# Patient Record
(demographics unavailable — no encounter records)

---

## 2025-06-09 NOTE — HISTORY OF PRESENT ILLNESS
[FreeTextEntry1] : Mr. Alcantar is a 67M with history of cardiomyopathy, VT with ICD, CAD with PCI presented for evaluation for ANOCA.   Reports episodes of chest pain, postly at rest and sporadic but pressure in chest. Recently had diagnostic catheterization told that had evidence of coronary spasm. Reports some relief with nitro.   PMH: Hx of VT requiring ICD, shocked 1x, CM, CAD, PCI  Meds: aspirin 81, famotidine 40, Toprol 25, Nifedipine 30, PRN nitroglycern SL, ranexa 500 BID, rosuvastatin 10, tafluprost, dorzolamide-timolol Social: Never smoker, occassional ETOH, denies recreational- no cocaine, no methamphtamines Fhx; +family history

## 2025-06-09 NOTE — CARDIOLOGY SUMMARY
[de-identified] : Sinus bradycardia with RBBB, 1 apc [de-identified] : [facility] Dec 2024: (Disk provided-- RCA appears dominant with mild diffuse dsiease, LM mild luminal irregularities LAD mild diffuse disease, D1 patent stent, Cx mild diffuse disease

## 2025-06-09 NOTE — PHYSICAL EXAM
[Well Developed] : well developed [Well Nourished] : well nourished [Normal Conjunctiva] : normal conjunctiva [Normal Venous Pressure] : normal venous pressure [Normal S1, S2] : normal S1, S2 [No Murmur] : no murmur [Clear Lung Fields] : clear lung fields [Good Air Entry] : good air entry [No Respiratory Distress] : no respiratory distress  [Normal Gait] : normal gait [Soft] : abdomen soft [Non Tender] : non-tender [No Edema] : no edema [No Cyanosis] : no cyanosis [No Rash] : no rash [Moves all extremities] : moves all extremities [Alert and Oriented] : alert and oriented

## 2025-06-09 NOTE — PHYSICAL EXAM
[Well Developed] : well developed [Well Nourished] : well nourished [Normal Venous Pressure] : normal venous pressure [Normal Conjunctiva] : normal conjunctiva [No Murmur] : no murmur [Normal S1, S2] : normal S1, S2 [Clear Lung Fields] : clear lung fields [Good Air Entry] : good air entry [No Respiratory Distress] : no respiratory distress  [Normal Gait] : normal gait [Non Tender] : non-tender [Soft] : abdomen soft [No Edema] : no edema [No Cyanosis] : no cyanosis [No Rash] : no rash [Alert and Oriented] : alert and oriented [Moves all extremities] : moves all extremities

## 2025-06-09 NOTE — REASON FOR VISIT
[FreeTextEntry1] : Mr. Alcantar is a 67M with history of cardiomyopathy, VT with ICD, CAD with PCI presented for evaluation for ANOCA.

## 2025-06-09 NOTE — REVIEW OF SYSTEMS
[Fever] : no fever [Headache] : no headache [SOB] : no shortness of breath [Dyspnea on exertion] : not dyspnea during exertion [Lower Ext Edema] : no extremity edema [Chest Discomfort] : no chest discomfort [Orthopnea] : no orthopnea [Palpitations] : no palpitations [Dizziness] : no dizziness [Negative] : Neurological

## 2025-06-09 NOTE — REVIEW OF SYSTEMS
[Fever] : no fever [Headache] : no headache [SOB] : no shortness of breath [Dyspnea on exertion] : not dyspnea during exertion [Chest Discomfort] : no chest discomfort [Lower Ext Edema] : no extremity edema [Palpitations] : no palpitations [Orthopnea] : no orthopnea [Dizziness] : no dizziness [Negative] : Neurological

## 2025-06-09 NOTE — ASSESSMENT
[FreeTextEntry1] : Mr. Alcantar is a 67M with history of cardiomyopathy, VT with ICD, CAD with PCI presented for evaluation for ANOCA.   1) Coronary artery spasm Reported some evidence of spasm during cardiac catheterizaiton, I dont see it on films provided. Symptoms are episodic, may have some temperature precipitate?  - Will trial diltiazem, hold nifedipine + BB to assess  - IF unable to improve with medical therapy, may consider Coronary functional testing No hx of decompensation  2) CAD Recent cardiac catheterization, patent stent - continue asa/statin. continue ranexa - will attempt to use diltiazem over bb  3) Hx of VT EKG sinus dedrick. Diltiazem as above

## 2025-06-09 NOTE — CARDIOLOGY SUMMARY
[de-identified] : Sinus bradycardia with RBBB, 1 apc [de-identified] : [facility] Dec 2024: (Disk provided-- RCA appears dominant with mild diffuse dsiease, LM mild luminal irregularities LAD mild diffuse disease, D1 patent stent, Cx mild diffuse disease